# Patient Record
Sex: FEMALE | Race: WHITE | NOT HISPANIC OR LATINO | URBAN - METROPOLITAN AREA
[De-identification: names, ages, dates, MRNs, and addresses within clinical notes are randomized per-mention and may not be internally consistent; named-entity substitution may affect disease eponyms.]

---

## 2018-08-08 ENCOUNTER — EMERGENCY (EMERGENCY)
Facility: HOSPITAL | Age: 83
LOS: 1 days | Discharge: ANOTHER HOSPITAL | End: 2018-08-08
Attending: EMERGENCY MEDICINE
Payer: MEDICARE

## 2018-08-08 ENCOUNTER — INPATIENT (INPATIENT)
Facility: HOSPITAL | Age: 83
LOS: 1 days | Discharge: HOME HEALTH | End: 2018-08-10
Attending: STUDENT IN AN ORGANIZED HEALTH CARE EDUCATION/TRAINING PROGRAM | Admitting: STUDENT IN AN ORGANIZED HEALTH CARE EDUCATION/TRAINING PROGRAM
Payer: MEDICARE

## 2018-08-08 VITALS
OXYGEN SATURATION: 98 % | RESPIRATION RATE: 18 BRPM | DIASTOLIC BLOOD PRESSURE: 92 MMHG | HEART RATE: 66 BPM | SYSTOLIC BLOOD PRESSURE: 172 MMHG

## 2018-08-08 VITALS
SYSTOLIC BLOOD PRESSURE: 169 MMHG | DIASTOLIC BLOOD PRESSURE: 94 MMHG | WEIGHT: 160.06 LBS | OXYGEN SATURATION: 95 % | HEIGHT: 65 IN | TEMPERATURE: 98 F | HEART RATE: 66 BPM | RESPIRATION RATE: 20 BRPM

## 2018-08-08 VITALS
HEART RATE: 70 BPM | OXYGEN SATURATION: 97 % | RESPIRATION RATE: 16 BRPM | SYSTOLIC BLOOD PRESSURE: 159 MMHG | TEMPERATURE: 98 F | DIASTOLIC BLOOD PRESSURE: 50 MMHG

## 2018-08-08 DIAGNOSIS — S06.6X9A TRAUMATIC SUBARACHNOID HEMORRHAGE WITH LOSS OF CONSCIOUSNESS OF UNSPECIFIED DURATION, INITIAL ENCOUNTER: ICD-10-CM

## 2018-08-08 LAB
ANION GAP SERPL CALC-SCNC: 5 MMOL/L — SIGNIFICANT CHANGE UP (ref 5–17)
BASOPHILS # BLD AUTO: 0 K/UL — SIGNIFICANT CHANGE UP (ref 0–0.2)
BASOPHILS NFR BLD AUTO: 0.7 % — SIGNIFICANT CHANGE UP (ref 0–2)
BUN SERPL-MCNC: 24 MG/DL — HIGH (ref 7–18)
CALCIUM SERPL-MCNC: 8.8 MG/DL — SIGNIFICANT CHANGE UP (ref 8.4–10.5)
CHLORIDE SERPL-SCNC: 110 MMOL/L — HIGH (ref 96–108)
CK MB BLD-MCNC: 2.2 % — SIGNIFICANT CHANGE UP (ref 0–3.5)
CK MB CFR SERPL CALC: 1.6 NG/ML — SIGNIFICANT CHANGE UP (ref 0–3.6)
CK SERPL-CCNC: 72 U/L — SIGNIFICANT CHANGE UP (ref 21–215)
CO2 SERPL-SCNC: 26 MMOL/L — SIGNIFICANT CHANGE UP (ref 22–31)
CREAT SERPL-MCNC: 0.82 MG/DL — SIGNIFICANT CHANGE UP (ref 0.5–1.3)
EOSINOPHIL # BLD AUTO: 0.1 K/UL — SIGNIFICANT CHANGE UP (ref 0–0.5)
EOSINOPHIL NFR BLD AUTO: 2.2 % — SIGNIFICANT CHANGE UP (ref 0–6)
GLUCOSE SERPL-MCNC: 95 MG/DL — SIGNIFICANT CHANGE UP (ref 70–99)
HCT VFR BLD CALC: 41.8 % — SIGNIFICANT CHANGE UP (ref 34.5–45)
HGB BLD-MCNC: 13.4 G/DL — SIGNIFICANT CHANGE UP (ref 11.5–15.5)
LYMPHOCYTES # BLD AUTO: 1.2 K/UL — SIGNIFICANT CHANGE UP (ref 1–3.3)
LYMPHOCYTES # BLD AUTO: 18.8 % — SIGNIFICANT CHANGE UP (ref 13–44)
MCHC RBC-ENTMCNC: 30.6 PG — SIGNIFICANT CHANGE UP (ref 27–34)
MCHC RBC-ENTMCNC: 32.1 GM/DL — SIGNIFICANT CHANGE UP (ref 32–36)
MCV RBC AUTO: 95.2 FL — SIGNIFICANT CHANGE UP (ref 80–100)
MONOCYTES # BLD AUTO: 0.5 K/UL — SIGNIFICANT CHANGE UP (ref 0–0.9)
MONOCYTES NFR BLD AUTO: 7 % — SIGNIFICANT CHANGE UP (ref 2–14)
NEUTROPHILS # BLD AUTO: 4.7 K/UL — SIGNIFICANT CHANGE UP (ref 1.8–7.4)
NEUTROPHILS NFR BLD AUTO: 71.3 % — SIGNIFICANT CHANGE UP (ref 43–77)
PLATELET # BLD AUTO: 173 K/UL — SIGNIFICANT CHANGE UP (ref 150–400)
POTASSIUM SERPL-MCNC: 4.3 MMOL/L — SIGNIFICANT CHANGE UP (ref 3.5–5.3)
POTASSIUM SERPL-SCNC: 4.3 MMOL/L — SIGNIFICANT CHANGE UP (ref 3.5–5.3)
RBC # BLD: 4.39 M/UL — SIGNIFICANT CHANGE UP (ref 3.8–5.2)
RBC # FLD: 13.2 % — SIGNIFICANT CHANGE UP (ref 10.3–14.5)
SODIUM SERPL-SCNC: 141 MMOL/L — SIGNIFICANT CHANGE UP (ref 135–145)
TROPONIN I SERPL-MCNC: <0.015 NG/ML — SIGNIFICANT CHANGE UP (ref 0–0.04)
WBC # BLD: 6.6 K/UL — SIGNIFICANT CHANGE UP (ref 3.8–10.5)
WBC # FLD AUTO: 6.6 K/UL — SIGNIFICANT CHANGE UP (ref 3.8–10.5)

## 2018-08-08 PROCEDURE — 90715 TDAP VACCINE 7 YRS/> IM: CPT

## 2018-08-08 PROCEDURE — 99223 1ST HOSP IP/OBS HIGH 75: CPT

## 2018-08-08 PROCEDURE — 12001 RPR S/N/AX/GEN/TRNK 2.5CM/<: CPT

## 2018-08-08 PROCEDURE — 85027 COMPLETE CBC AUTOMATED: CPT

## 2018-08-08 PROCEDURE — 90471 IMMUNIZATION ADMIN: CPT

## 2018-08-08 PROCEDURE — 82553 CREATINE MB FRACTION: CPT

## 2018-08-08 PROCEDURE — 70450 CT HEAD/BRAIN W/O DYE: CPT | Mod: 26

## 2018-08-08 PROCEDURE — 99285 EMERGENCY DEPT VISIT HI MDM: CPT | Mod: 25

## 2018-08-08 PROCEDURE — 80048 BASIC METABOLIC PNL TOTAL CA: CPT

## 2018-08-08 PROCEDURE — 84484 ASSAY OF TROPONIN QUANT: CPT

## 2018-08-08 PROCEDURE — 70450 CT HEAD/BRAIN W/O DYE: CPT | Mod: 26,77

## 2018-08-08 PROCEDURE — 70450 CT HEAD/BRAIN W/O DYE: CPT

## 2018-08-08 PROCEDURE — 82550 ASSAY OF CK (CPK): CPT

## 2018-08-08 RX ORDER — AMLODIPINE BESYLATE 2.5 MG/1
2.5 TABLET ORAL ONCE
Qty: 0 | Refills: 0 | Status: COMPLETED | OUTPATIENT
Start: 2018-08-08 | End: 2018-08-08

## 2018-08-08 RX ORDER — TETANUS TOXOID, REDUCED DIPHTHERIA TOXOID AND ACELLULAR PERTUSSIS VACCINE, ADSORBED 5; 2.5; 8; 8; 2.5 [IU]/.5ML; [IU]/.5ML; UG/.5ML; UG/.5ML; UG/.5ML
0.5 SUSPENSION INTRAMUSCULAR ONCE
Qty: 0 | Refills: 0 | Status: COMPLETED | OUTPATIENT
Start: 2018-08-08 | End: 2018-08-08

## 2018-08-08 RX ORDER — MECLIZINE HCL 12.5 MG
25 TABLET ORAL ONCE
Qty: 0 | Refills: 0 | Status: COMPLETED | OUTPATIENT
Start: 2018-08-08 | End: 2018-08-09

## 2018-08-08 RX ORDER — HYDRALAZINE HCL 50 MG
10 TABLET ORAL ONCE
Qty: 0 | Refills: 0 | Status: COMPLETED | OUTPATIENT
Start: 2018-08-08 | End: 2018-08-09

## 2018-08-08 RX ADMIN — AMLODIPINE BESYLATE 2.5 MILLIGRAM(S): 2.5 TABLET ORAL at 23:06

## 2018-08-08 RX ADMIN — TETANUS TOXOID, REDUCED DIPHTHERIA TOXOID AND ACELLULAR PERTUSSIS VACCINE, ADSORBED 0.5 MILLILITER(S): 5; 2.5; 8; 8; 2.5 SUSPENSION INTRAMUSCULAR at 11:44

## 2018-08-08 NOTE — ED PROVIDER NOTE - OBJECTIVE STATEMENT
89 y/o F pt with a significant PMHx of vertigo and no significant PSHx BIB EMS to ED with a mechanical fall. Patient reports using toilet where she felt fine, patient went to stand up and felt dizzy. Patient states she hit her head on the sink. Patient says she used her life alert where EMS arrived and brought patient to the ED. Patient denies loss of consciousness or any other complaints. Allergies: Penicillin (Unknown).

## 2018-08-08 NOTE — ED PROVIDER NOTE - MEDICAL DECISION MAKING DETAILS
89 y/o female w/ small subarachnoid hemorrhage, transferred to [facility] for neurosx eval- consult neurosx, repeat head CT, reassess

## 2018-08-08 NOTE — ED CDU PROVIDER INITIAL DAY NOTE - PROGRESS NOTE DETAILS
Pt observed in ED red section, alert and oriented x 3, BP elevated 18/75, pt had been medicated at 23:09, 2.5mgs Amlodipine which is her usual dose of medications, will perform neuro check q 4. Pt with no neurologic complaints; however, /71, will give 10mgs Hydralazine IVP and continue to monitor.

## 2018-08-08 NOTE — ED PROVIDER NOTE - PROGRESS NOTE DETAILS
Documents sent over with patient from [place]- CT preformed at 12:11PM, small acute focal right parietal subarachnoid hemorrhage. luzma: as per sister, meds include:  amlodipine 2.5; sertraline; meloxicam; oxybutynin; donezipil; levothyroxine. luzma: additional medical care provider by Em resident while in ED. Care supervised by myself.

## 2018-08-08 NOTE — ED CDU PROVIDER INITIAL DAY NOTE - MEDICAL DECISION MAKING DETAILS
A: 89yo F w/ traumatic subarachnoid hemorrhage, evaluated by neurosurgery- no acute intervention at this time.   -Neuro checks  -Neurosurgical consult in AM

## 2018-08-08 NOTE — ED CDU PROVIDER INITIAL DAY NOTE - OBJECTIVE STATEMENT
89yo F w/ pmhx of HTN and hypothyroidism admitted to the CDU 89yo F w/ pmhx of HTN and hypothyroidism admitted to the CDU for observation of traumatic subarachnoid hemorrhage s/p fall yesterday. Patient was transferred from [place] after hitting her head on the bathroom sink this morning s/p slip and fall injury causing a laceration to the right parietal region.  At [place], laceration was stapled and CT head was performed which showed a right parietal TSAH.  Patient was transferred to [facility] for neurosurgical evaluation.  Patient reports mild tenderness around laceration site and mild HA. Denies, N/V, lethargy, blurry vision, chest pain, sob, focal weakness, numbness or paresthesias of extremities.

## 2018-08-08 NOTE — CONSULT NOTE ADULT - SUBJECTIVE AND OBJECTIVE BOX
PETRA VITAL 89y ,Female  HPI:    PAST MEDICAL & SURGICAL HISTORY:    Allergies    penicillin (Unknown)    Intolerances      MEDICATIONS  (STANDING):    MEDICATIONS  (PRN):    Vital Signs Last 24 Hrs  T(C): 36.8 (08 Aug 2018 16:14), Max: 36.8 (08 Aug 2018 16:14)  T(F): 98.2 (08 Aug 2018 16:14), Max: 98.2 (08 Aug 2018 16:14)  HR: 70 (08 Aug 2018 16:14) (70 - 70)  BP: 159/50 (08 Aug 2018 16:14) (159/50 - 159/50)  BP(mean): --  RR: 16 (08 Aug 2018 16:14) (16 - 16)  SpO2: 97% (08 Aug 2018 16:14) (97% - 97%)    PE:  AA&0 x   Motor:   Sensory:       LABS: PETRA VITAL 89y ,Female  HPI:  Patient transferred from [place] after hitting her head on the bathroom sink , causing a laceration to the right parietal region.  At [place], laceration was stapled and CT head was performed which showed a right parietal TSAH.  patient was transferred to [facility] for neurosurgical evalaution.  UPOn exam, patient reports mild tenderness around laceration site,  Denies, N/V, lethargy, blurry vision.    PAST MEDICAL & SURGICAL HISTORY:    Vital Signs Last 24 Hrs  T(C): 36.8 (08 Aug 2018 16:14), Max: 36.8 (08 Aug 2018 16:14)  T(F): 98.2 (08 Aug 2018 16:14), Max: 98.2 (08 Aug 2018 16:14)  HR: 70 (08 Aug 2018 16:14) (70 - 70)  BP: 159/50 (08 Aug 2018 16:14) (159/50 - 159/50)  BP(mean): --  RR: 16 (08 Aug 2018 16:14) (16 - 16)  SpO2: 97% (08 Aug 2018 16:14) (97% - 97%)    PE:  AA&0 x 3, CN 2-12 grossly intact, speach clear, follows commands  + scalp laceration, right parietal region, stapled closed  Motor: strength 5/5 throughout  Sensory: intact to light touch  no Drift      LABS: PETRA VITAL 89y ,Female  HPI:  Patient transferred from [place] after hitting her head on the bathroom sink this morning, causing a laceration to the right parietal region.  At [place], laceration was stapled and CT head was performed which showed a right parietal TSAH.  patient was transferred to [facility] for neurosurgical evalaution.  UPOn exam, patient reports mild tenderness around laceration site,  Denies, N/V, lethargy, blurry vision.    PAST MEDICAL & SURGICAL HISTORY:  HTN  Hypothyroid    Vital Signs Last 24 Hrs  T(C): 36.8 (08 Aug 2018 16:14), Max: 36.8 (08 Aug 2018 16:14)  T(F): 98.2 (08 Aug 2018 16:14), Max: 98.2 (08 Aug 2018 16:14)  HR: 70 (08 Aug 2018 16:14) (70 - 70)  BP: 159/50 (08 Aug 2018 16:14) (159/50 - 159/50)  BP(mean): --  RR: 16 (08 Aug 2018 16:14) (16 - 16)  SpO2: 97% (08 Aug 2018 16:14) (97% - 97%)    PE:  AA&0 x 3, CN 2-12 grossly intact, speach clear, follows commands  + scalp laceration, right parietal region, stapled closed  Motor: strength 5/5 throughout  Sensory: intact to light touch  no Drift      LABS:

## 2018-08-08 NOTE — ED ADULT NURSE REASSESSMENT NOTE - NS ED NURSE REASSESS COMMENT FT1
MD Urbina aware of elevated BP.  Pt appears anxious.  Attempting to verbally calm pt.  MD will revaluate.

## 2018-08-08 NOTE — ED ADULT NURSE NOTE - OBJECTIVE STATEMENT
pt is a transfer from [facility] pt A&Ox3, pt states she became dizzy this morning and fell and was c/o right sided head pain, staples noted to right side lateral occipital region, dry and intact, pt ambulatory and steady with walker pt educated to not ambulate without assistance, pt given call bell, no bruising noted respirations equal and non labored safety maintained will monitor

## 2018-08-08 NOTE — ED ADULT NURSE NOTE - NSIMPLEMENTINTERV_GEN_ALL_ED
Implemented All Fall with Harm Risk Interventions:  [place] to call system. Call bell, personal items and telephone within reach. Instruct patient to call for assistance. Room bathroom lighting operational. Non-slip footwear when patient is off stretcher. Physically safe environment: no spills, clutter or unnecessary equipment. Stretcher in lowest position, wheels locked, appropriate side rails in place. Provide visual cue, wrist band, yellow gown, etc. Monitor gait and stability. Monitor for mental status changes and reorient to person, place, and time. Review medications for side effects contributing to fall risk. Reinforce activity limits and safety measures with patient and family. Provide visual clues: red socks.

## 2018-08-08 NOTE — ED PROVIDER NOTE - ATTENDING CONTRIBUTION TO CARE
luzma: 89 female lost balance, falling and hit head. At [facility], found to have a small SAH and sent to [facility] for further care. No other injury noted. On exam, pt is awake, conversant and lucid. Exam unremarkable. Seen by neurosurgery in ED. Pt esme be observed in CDU to monitor SAH and general medical condition.

## 2018-08-08 NOTE — ED CDU PROVIDER INITIAL DAY NOTE - ATTENDING CONTRIBUTION TO CARE
luzma: 89 female lost balance, falling and hit head. At [facility], found to have a small SAH and sent to [facility] for further care. No other injury noted. On exam, pt is awake, conversant and lucid. Exam unremarkable. Seen by neurosurgery in ED. Pt esme be observed in CDU to monitor SAH and general medical condition.

## 2018-08-08 NOTE — ED PROVIDER NOTE - OBJECTIVE STATEMENT
89 y/o female pmh htn, hypothyroidism transferred from [place] [facility] for traumatic subarachnoid. Pt admits to going to the bathroom this morning, upon standing up from the toilet and trying to reach her walker, she fell backwards, striking the back of her head on the sink. Pt denies LOC. Pt states that she pushed her life alert button and was brought the ER. Pt admits to mild headache and states that she forgot the punch line to her favorite joke. Pt denies chest pain, sob, palpitations, diaphoresis, n/v/d, numbness, tingling, weakness, syncope, fever or chills.

## 2018-08-08 NOTE — ED PROVIDER NOTE - MEDICAL DECISION MAKING DETAILS
Patient with head trauma and vertigo. Will obtain labs, CT of head and reassess. Will update pt test.

## 2018-08-08 NOTE — ED PROVIDER NOTE - PROGRESS NOTE DETAILS
1. Have you been to the ER, urgent care clinic since your last visit? Hospitalized since your last visit?  no    2. Have you seen or consulted any other health care providers outside of the [address] [place] [address] since your last visit? Include any pap smears or colon screening.    no Head CT with hemorrhage.  Case discussed with Neurosurgery, will transfer to [facility].

## 2018-08-08 NOTE — CONSULT NOTE ADULT - PROBLEM SELECTOR RECOMMENDATION 9
1. No acute Neurosurgical intervention  2. Repeat CTH in 6 hours from prior study  3. case to be d/w attending  4. NO antiplatelet medications to be given

## 2018-08-09 DIAGNOSIS — I60.9 NONTRAUMATIC SUBARACHNOID HEMORRHAGE, UNSPECIFIED: ICD-10-CM

## 2018-08-09 DIAGNOSIS — I10 ESSENTIAL (PRIMARY) HYPERTENSION: ICD-10-CM

## 2018-08-09 DIAGNOSIS — R42 DIZZINESS AND GIDDINESS: ICD-10-CM

## 2018-08-09 DIAGNOSIS — Z29.9 ENCOUNTER FOR PROPHYLACTIC MEASURES, UNSPECIFIED: ICD-10-CM

## 2018-08-09 PROCEDURE — 70498 CT ANGIOGRAPHY NECK: CPT | Mod: 26

## 2018-08-09 PROCEDURE — 99233 SBSQ HOSP IP/OBS HIGH 50: CPT

## 2018-08-09 PROCEDURE — 73030 X-RAY EXAM OF SHOULDER: CPT | Mod: 26,RT

## 2018-08-09 PROCEDURE — 70496 CT ANGIOGRAPHY HEAD: CPT | Mod: 26

## 2018-08-09 PROCEDURE — 70450 CT HEAD/BRAIN W/O DYE: CPT | Mod: 26,59

## 2018-08-09 RX ORDER — PREGABALIN 225 MG/1
0 CAPSULE ORAL
Qty: 0 | Refills: 0 | COMMUNITY

## 2018-08-09 RX ORDER — MECLIZINE HCL 12.5 MG
1 TABLET ORAL
Qty: 0 | Refills: 0 | COMMUNITY

## 2018-08-09 RX ORDER — SERTRALINE 25 MG/1
100 TABLET, FILM COATED ORAL DAILY
Qty: 0 | Refills: 0 | Status: DISCONTINUED | OUTPATIENT
Start: 2018-08-09 | End: 2018-08-10

## 2018-08-09 RX ORDER — AMLODIPINE BESYLATE 2.5 MG/1
2.5 TABLET ORAL DAILY
Qty: 0 | Refills: 0 | Status: DISCONTINUED | OUTPATIENT
Start: 2018-08-09 | End: 2018-08-10

## 2018-08-09 RX ORDER — LEVOTHYROXINE SODIUM 125 MCG
25 TABLET ORAL DAILY
Qty: 0 | Refills: 0 | Status: DISCONTINUED | OUTPATIENT
Start: 2018-08-09 | End: 2018-08-10

## 2018-08-09 RX ORDER — SERTRALINE 25 MG/1
1 TABLET, FILM COATED ORAL
Qty: 0 | Refills: 0 | COMMUNITY

## 2018-08-09 RX ORDER — CHOLECALCIFEROL (VITAMIN D3) 125 MCG
0 CAPSULE ORAL
Qty: 0 | Refills: 0 | COMMUNITY

## 2018-08-09 RX ORDER — OXYBUTYNIN CHLORIDE 5 MG
1 TABLET ORAL
Qty: 0 | Refills: 0 | COMMUNITY

## 2018-08-09 RX ORDER — DONEPEZIL HYDROCHLORIDE 10 MG/1
1 TABLET, FILM COATED ORAL
Qty: 0 | Refills: 0 | COMMUNITY

## 2018-08-09 RX ORDER — MECLIZINE HCL 12.5 MG
12.5 TABLET ORAL DAILY
Qty: 0 | Refills: 0 | Status: DISCONTINUED | OUTPATIENT
Start: 2018-08-09 | End: 2018-08-10

## 2018-08-09 RX ORDER — LEVOTHYROXINE SODIUM 125 MCG
1 TABLET ORAL
Qty: 0 | Refills: 0 | COMMUNITY

## 2018-08-09 RX ADMIN — Medication 10 MILLIGRAM(S): at 00:05

## 2018-08-09 RX ADMIN — AMLODIPINE BESYLATE 2.5 MILLIGRAM(S): 2.5 TABLET ORAL at 19:14

## 2018-08-09 RX ADMIN — Medication 12.5 MILLIGRAM(S): at 19:14

## 2018-08-09 RX ADMIN — Medication 25 MILLIGRAM(S): at 00:05

## 2018-08-09 RX ADMIN — SERTRALINE 100 MILLIGRAM(S): 25 TABLET, FILM COATED ORAL at 19:14

## 2018-08-09 NOTE — ED CDU PROVIDER SUBSEQUENT DAY NOTE - PROGRESS NOTE DETAILS
Pt's BP at 2:11 was 154/64 after Hydralazine. Patient is resting comfortably in CDU Bed 12 in [facility]. Pt states she has no pain at this time. Pt is aware they area receiving treatment and will be observed in CDU until further notice. Will continue to monitor and document any acute interval changes.

## 2018-08-09 NOTE — ED CDU PROVIDER DISPOSITION NOTE - CLINICAL COURSE
Pt with subarachnoid bleed s/p fall doing well in CDU. Neurosurgery has seen pt and will continue to follow ct results from latest ct. Will admit to medicine, pt unable to walk without assistance, lives alone. Will need home care

## 2018-08-09 NOTE — ED CDU PROVIDER SUBSEQUENT DAY NOTE - HISTORY
· HPI Objective Statement: 89yo F w/ pmhx of HTN and hypothyroidism admitted to the CDU for observation of traumatic subarachnoid hemorrhage s/p fall yesterday. Patient was transferred from [place] after hitting her head on the bathroom sink this morning s/p slip and fall injury causing a laceration to the right parietal region.  At [place], laceration was stapled and CT head was performed which showed a right parietal TSAH.  Patient was transferred to [facility] for neurosurgical evaluation.  Patient reports mild tenderness around laceration site and mild HA. Denies, N/V, lethargy, blurry vision, chest pain, sob, focal weakness, numbness or paresthesias of extremities.  No acute changes since initial presentation to the CDU.

## 2018-08-09 NOTE — ED CDU PROVIDER SUBSEQUENT DAY NOTE - MEDICAL DECISION MAKING DETAILS
PT with TSAH unable to walk without assistance, lives alone. Will admit to medicine, Neurosurgery to follow

## 2018-08-09 NOTE — ED CDU PROVIDER DISPOSITION NOTE - ATTENDING CONTRIBUTION TO CARE
89F h/o HTN, hypothyroid s/p fall in bathroom with head injury and SAH, stable on rpt CT. CTA negative for vascular lesion.  Concern for safety and home and unable to ambulate. Plan for admission.

## 2018-08-09 NOTE — ED CDU PROVIDER SUBSEQUENT DAY NOTE - ATTENDING CONTRIBUTION TO CARE
89F h/o HTN, hypothyroid s/p fall in bathroom with head injury.  States she has had intermittent vertigo over the last two weeks.  Unsure why she fell. Seen at [facility] and found to have a traumatic SAH, transferred to ED. Of note, she lives at home alone. Repeat CT in ED unchanged.  Noted to have elevated BP, improved w IV medications. Patient now feels lightheaded, no HA, has trouble ambulating independently. Overnight VSS, afebrile. On exam nad, breathing comfortably, lungs clear, rrr, abd soft, 2+ pulses, no edema, no rash, nonfocal neuro exam. Given ongoing vertigo, plan for CTA head and neck.

## 2018-08-09 NOTE — H&P ADULT - ASSESSMENT
Nisha Cheney is a 89 y/o female with HTN, hypothyroidism who had been transferred from [facility] because of a subarachnoid hemorrhage as result of a fall. Patient normally ambulates with rolling walker, however since fall yesterday, her walking has been further compromised. Neurosurgery follow up.

## 2018-08-10 ENCOUNTER — TRANSCRIPTION ENCOUNTER (OUTPATIENT)
Age: 83
End: 2018-08-10

## 2018-08-10 VITALS
RESPIRATION RATE: 16 BRPM | SYSTOLIC BLOOD PRESSURE: 129 MMHG | TEMPERATURE: 99 F | HEART RATE: 76 BPM | OXYGEN SATURATION: 97 % | DIASTOLIC BLOOD PRESSURE: 64 MMHG

## 2018-08-10 DIAGNOSIS — E03.9 HYPOTHYROIDISM, UNSPECIFIED: ICD-10-CM

## 2018-08-10 LAB
BUN SERPL-MCNC: 14 MG/DL — SIGNIFICANT CHANGE UP (ref 7–23)
CALCIUM SERPL-MCNC: 8.5 MG/DL — SIGNIFICANT CHANGE UP (ref 8.4–10.5)
CHLORIDE SERPL-SCNC: 105 MMOL/L — SIGNIFICANT CHANGE UP (ref 98–107)
CO2 SERPL-SCNC: 20 MMOL/L — LOW (ref 22–31)
CREAT SERPL-MCNC: 0.72 MG/DL — SIGNIFICANT CHANGE UP (ref 0.5–1.3)
GLUCOSE SERPL-MCNC: 100 MG/DL — HIGH (ref 70–99)
HCT VFR BLD CALC: 42.2 % — SIGNIFICANT CHANGE UP (ref 34.5–45)
HGB BLD-MCNC: 14 G/DL — SIGNIFICANT CHANGE UP (ref 11.5–15.5)
MCHC RBC-ENTMCNC: 30.2 PG — SIGNIFICANT CHANGE UP (ref 27–34)
MCHC RBC-ENTMCNC: 33.2 % — SIGNIFICANT CHANGE UP (ref 32–36)
MCV RBC AUTO: 91.1 FL — SIGNIFICANT CHANGE UP (ref 80–100)
NRBC # FLD: 0 — SIGNIFICANT CHANGE UP
PLATELET # BLD AUTO: 142 K/UL — LOW (ref 150–400)
PMV BLD: 9.9 FL — SIGNIFICANT CHANGE UP (ref 7–13)
POTASSIUM SERPL-MCNC: 3.5 MMOL/L — SIGNIFICANT CHANGE UP (ref 3.5–5.3)
POTASSIUM SERPL-SCNC: 3.5 MMOL/L — SIGNIFICANT CHANGE UP (ref 3.5–5.3)
RBC # BLD: 4.63 M/UL — SIGNIFICANT CHANGE UP (ref 3.8–5.2)
RBC # FLD: 13.7 % — SIGNIFICANT CHANGE UP (ref 10.3–14.5)
SODIUM SERPL-SCNC: 139 MMOL/L — SIGNIFICANT CHANGE UP (ref 135–145)
WBC # BLD: 6.68 K/UL — SIGNIFICANT CHANGE UP (ref 3.8–10.5)
WBC # FLD AUTO: 6.68 K/UL — SIGNIFICANT CHANGE UP (ref 3.8–10.5)

## 2018-08-10 PROCEDURE — 99239 HOSP IP/OBS DSCHRG MGMT >30: CPT

## 2018-08-10 RX ORDER — MELOXICAM 15 MG/1
1 TABLET ORAL
Qty: 0 | Refills: 0 | COMMUNITY

## 2018-08-10 RX ORDER — AMLODIPINE BESYLATE 2.5 MG/1
1 TABLET ORAL
Qty: 30 | Refills: 0 | OUTPATIENT
Start: 2018-08-10

## 2018-08-10 RX ADMIN — Medication 25 MICROGRAM(S): at 07:41

## 2018-08-10 RX ADMIN — SERTRALINE 100 MILLIGRAM(S): 25 TABLET, FILM COATED ORAL at 12:30

## 2018-08-10 RX ADMIN — AMLODIPINE BESYLATE 2.5 MILLIGRAM(S): 2.5 TABLET ORAL at 07:39

## 2018-08-10 NOTE — DISCHARGE NOTE ADULT - HOME CARE AGENCY
[facility] at [phone] [facility] care agency at [phone], Home PT and aide evaluation, RN will call and make an appointment.

## 2018-08-10 NOTE — PROGRESS NOTE ADULT - ASSESSMENT
89 y/o female with HTN, hypothyroidism who had been transferred from [facility] because of a subarachnoid hemorrhage as result of a fall. Patient normally ambulates with rolling walker, however since fall yesterday, her walking has been further compromised. Neurosurgery follow up.

## 2018-08-10 NOTE — PROGRESS NOTE ADULT - SUBJECTIVE AND OBJECTIVE BOX
Patient is a 89y old  Female who presents with a chief complaint of Fall with subsequent subarachnoid hemorrhage (10 Aug 2018 10:12)      SUBJECTIVE / OVERNIGHT EVENTS:  Patient continues to complain of vertigo, no vision changes, hearing loss, motor/sensory changes.     MEDICATIONS  (STANDING):  amLODIPine   Tablet 2.5 milliGRAM(s) Oral daily  levothyroxine 25 MICROGram(s) Oral daily  sertraline 100 milliGRAM(s) Oral daily    MEDICATIONS  (PRN):  meclizine 12.5 milliGRAM(s) Oral daily PRN Dizziness      Vital Signs Last 24 Hrs  T(C): 36.9 (10 Aug 2018 09:54), Max: 36.9 (10 Aug 2018 09:54)  T(F): 98.4 (10 Aug 2018 09:54), Max: 98.4 (10 Aug 2018 09:54)  HR: 71 (10 Aug 2018 09:54) (66 - 76)  BP: 155/67 (10 Aug 2018 09:54) (134/99 - 176/73)  BP(mean): 176 (10 Aug 2018 05:44) (176 - 176)  RR: 16 (10 Aug 2018 09:54) (15 - 18)  SpO2: 98% (10 Aug 2018 09:54) (98% - 100%)  CAPILLARY BLOOD GLUCOSE        I&O's Summary    PHYSICAL EXAM  GENERAL: NAD, well-developed, very cooperative with exam  HEAD:  Atraumatic, Normocephalic  EYES: EOMI, PERRLA, conjunctiva and sclera clear  NECK: Supple, No JVD  CHEST/LUNG: Clear to auscultation bilaterally; No wheeze  HEART: Regular rate and rhythm; No murmurs, rubs, or gallops  ABDOMEN: Soft, Nontender, Nondistended; Bowel sounds present  EXTREMITIES:  2+ Peripheral Pulses, No clubbing, cyanosis, or edema  PSYCH: AAOx3  SKIN: No rashes or lesions    LABS:                        14.0   6.68  )-----------( 142      ( 10 Aug 2018 06:00 )             42.2     08-10    139  |  105  |  14  ----------------------------<  100<H>  3.5   |  20<L>  |  0.72    Ca    8.5      10 Aug 2018 06:00            RADIOLOGY & ADDITIONAL TESTS:    Imaging Personally Reviewed:  Consultant(s) Notes Reviewed:    Care Discussed with Consultants/Other Providers:

## 2018-08-10 NOTE — DISCHARGE NOTE ADULT - HOSPITAL COURSE
89 y/o female pmh htn, hypothyroidism transferred from [place] ER for traumatic subarachnoid. Pt admits to going to the bathroom this morning, upon standing up from the toilet and trying to reach her walker, she fell backwards, striking the back of her head on the sink. Pt denies LOC.      tx from [facility]    Traumatic subarachnoid hemorrhage  -Neurosurgery follow up- no surgical intervention  -Control BP  -No antiplatelet therapy or blood thinners.     Subarachnoid bleed  -As above.     Essential hypertension  -Continue home meds  -monitor BP very closely.     Vertigo  -Home meds. 89 y/o female pmh htn, hypothyroidism transferred from [place] [facility] for traumatic subarachnoid. Pt admits to going to the bathroom this morning, upon standing up from the toilet and trying to reach her walker, she fell backwards, striking the back of her head on the sink. Pt denies LOC.      tx from [facility]    Traumatic subarachnoid hemorrhage  -Neurosurgery follow up- no surgical intervention  -Control BP  -No antiplatelet therapy or blood thinners.     Subarachnoid bleed  -As above.     Essential hypertension  -Continue home meds  -monitor BP very closely.     Vertigo  -Home meds.     PT home with home PT    stable for discharge home with family and homecare and PT set up by FLAQUITA

## 2018-08-10 NOTE — PROGRESS NOTE ADULT - PROBLEM SELECTOR PLAN 2
Stable without any dysarthria, diplopia, hearing loss or other neuro deficits; no new pathology found on repeat head imaging  - continue meclizine  - patient to have close F/U with PCP

## 2018-08-10 NOTE — DISCHARGE NOTE ADULT - PATIENT PORTAL LINK FT
You can access the [email or web address] Patient Portal, offered by [facility], by registering with the following website: [email or web address]

## 2018-08-10 NOTE — DISCHARGE NOTE ADULT - CARE PROVIDERS DIRECT ADDRESSES
[email or web address]

## 2018-08-10 NOTE — PHYSICAL THERAPY INITIAL EVALUATION ADULT - PERTINENT HX OF CURRENT PROBLEM, REHAB EVAL
Pt. is a 89 year old female admitted to [facility] secondary to nontraumatic subarachnoid hemorrhage. PMH: vertigo

## 2018-08-10 NOTE — DISCHARGE NOTE ADULT - PLAN OF CARE
management Neurosurgery consulted- no surgical intervention. Continue blood pressure medications as prescribed. Follow up with PCP. Neurosurgery consulted- no surgical intervention. Follow up with neuro surgery outpatient Continue blood pressure medications as prescribed, norvasc 2.5mg daily. Follow up with PCP within 2 weeks for blood pressure check.

## 2018-08-10 NOTE — PROGRESS NOTE ADULT - PROBLEM SELECTOR PLAN 1
No intervention as per neurosurgery; repeat CT with stable subarachnoid hemorrhage within the right postcentral sulcus, right parietal scalp hematoma, also noted to have an avidly enhancing left parafalcine meningioma  - continue to monitor BP  - counseled on need for fall precautions at home

## 2018-08-10 NOTE — DISCHARGE NOTE ADULT - CARE PROVIDER_API CALL
Daly Ramsay), [facility] Neurosurgery  General  [address] [address]  Phone: [phone]  Fax: [phone]

## 2018-08-10 NOTE — DISCHARGE NOTE ADULT - CARE PLAN
Principal Discharge DX:	Traumatic subarachnoid hemorrhage  Goal:	management  Assessment and plan of treatment:	Neurosurgery consulted- no surgical intervention.  Secondary Diagnosis:	Essential hypertension  Assessment and plan of treatment:	Continue blood pressure medications as prescribed. Follow up with PCP. Principal Discharge DX:	Traumatic subarachnoid hemorrhage  Goal:	management  Assessment and plan of treatment:	Neurosurgery consulted- no surgical intervention. Follow up with neuro surgery outpatient  Secondary Diagnosis:	Essential hypertension  Assessment and plan of treatment:	Continue blood pressure medications as prescribed, norvasc 2.5mg daily. Follow up with PCP within 2 weeks for blood pressure check.

## 2018-08-10 NOTE — DISCHARGE NOTE ADULT - MEDICATION SUMMARY - MEDICATIONS TO TAKE
I will START or STAY ON the medications listed below when I get home from the hospital:    sertraline 100 mg oral tablet  -- 1 tab(s) by mouth once a day  -- Indication: For depression    meclizine 12.5 mg oral tablet  -- 1 tab(s) by mouth once a day, As Needed  -- Indication: For Vertigo    amLODIPine 2.5 mg oral tablet  -- 1 tab(s) by mouth once a day  -- Indication: For HTN (hypertension)    donepezil 5 mg oral tablet  -- 1 tab(s) by mouth once a day (at bedtime)  -- Indication: For dementia    levothyroxine 25 mcg (0.025 mg) oral tablet  -- 1 tab(s) by mouth once a day  -- Indication: For Hypothyroidism    oxybutynin 5 mg/24 hours oral tablet, extended release  -- 1 tab(s) by mouth once a day  -- Indication: For assist with urination    Vitamin B12  -- Indication: For Supplement    Vitamin D3  -- Indication: For Supplement I will START or STAY ON the medications listed below when I get home from the hospital:    sertraline 100 mg oral tablet  -- 1 tab(s) by mouth once a day  -- Indication: For depression    meclizine 12.5 mg oral tablet  -- 1 tab(s) by mouth once a day, As Needed  -- Indication: For Vertigo    amLODIPine 2.5 mg oral tablet  -- 1 tab(s) by mouth once a day  -- Indication: For HTN (hypertension)    donepezil 5 mg oral tablet  -- 1 tab(s) by mouth once a day (at bedtime)  -- Indication: For dementia    levothyroxine 25 mcg (0.025 mg) oral tablet  -- 1 tab(s) by mouth once a day  -- Indication: For Hypothyroidism    oxybutynin 5 mg/24 hours oral tablet, extended release  -- 1 tab(s) by mouth once a day  -- Indication: For urinary assistance    Vitamin B12  -- Indication: For Supplement    Vitamin D3  -- Indication: For Supplement

## 2019-10-24 PROBLEM — R42 DIZZINESS AND GIDDINESS: Chronic | Status: ACTIVE | Noted: 2018-08-08

## 2019-10-24 PROBLEM — I10 ESSENTIAL (PRIMARY) HYPERTENSION: Chronic | Status: ACTIVE | Noted: 2018-08-08

## 2019-10-24 PROBLEM — E03.9 HYPOTHYROIDISM, UNSPECIFIED: Chronic | Status: ACTIVE | Noted: 2018-08-08

## 2020-01-24 ENCOUNTER — OUTPATIENT (OUTPATIENT)
Dept: OUTPATIENT SERVICES | Facility: HOSPITAL | Age: 85
LOS: 1 days | Discharge: HOME | End: 2020-01-24
Payer: MEDICARE

## 2020-01-24 PROCEDURE — 90792 PSYCH DIAG EVAL W/MED SRVCS: CPT

## 2020-01-24 PROCEDURE — 90791 PSYCH DIAGNOSTIC EVALUATION: CPT

## 2020-01-28 DIAGNOSIS — E03.9 HYPOTHYROIDISM, UNSPECIFIED: ICD-10-CM

## 2020-01-28 DIAGNOSIS — F03.90 UNSPECIFIED DEMENTIA WITHOUT BEHAVIORAL DISTURBANCE: ICD-10-CM

## 2020-01-28 DIAGNOSIS — R42 DIZZINESS AND GIDDINESS: ICD-10-CM

## 2020-01-28 DIAGNOSIS — F32.9 MAJOR DEPRESSIVE DISORDER, SINGLE EPISODE, UNSPECIFIED: ICD-10-CM

## 2020-01-28 DIAGNOSIS — I10 ESSENTIAL (PRIMARY) HYPERTENSION: ICD-10-CM

## 2020-02-04 PROCEDURE — 99213 OFFICE O/P EST LOW 20 MIN: CPT

## 2020-03-02 PROCEDURE — 99213 OFFICE O/P EST LOW 20 MIN: CPT

## 2020-04-09 PROCEDURE — 99213 OFFICE O/P EST LOW 20 MIN: CPT

## 2020-05-07 PROCEDURE — 99442: CPT

## 2020-05-21 PROCEDURE — 99442: CPT

## 2020-06-18 PROCEDURE — 99442: CPT

## 2020-08-13 PROCEDURE — 99442: CPT

## 2020-09-14 PROCEDURE — 99442: CPT

## 2020-11-12 PROCEDURE — 99442: CPT

## 2021-01-07 PROCEDURE — 99442: CPT

## 2021-01-28 DIAGNOSIS — F43.21 ADJUSTMENT DISORDER WITH DEPRESSED MOOD: ICD-10-CM

## 2021-04-05 PROCEDURE — 99443: CPT
